# Patient Record
Sex: MALE | Race: WHITE | NOT HISPANIC OR LATINO | Employment: UNEMPLOYED | URBAN - METROPOLITAN AREA
[De-identification: names, ages, dates, MRNs, and addresses within clinical notes are randomized per-mention and may not be internally consistent; named-entity substitution may affect disease eponyms.]

---

## 2018-01-23 ENCOUNTER — HOSPITAL ENCOUNTER (EMERGENCY)
Facility: MEDICAL CENTER | Age: 43
End: 2018-01-23
Attending: EMERGENCY MEDICINE

## 2018-01-23 VITALS
RESPIRATION RATE: 16 BRPM | HEIGHT: 76 IN | SYSTOLIC BLOOD PRESSURE: 150 MMHG | WEIGHT: 259.26 LBS | DIASTOLIC BLOOD PRESSURE: 65 MMHG | TEMPERATURE: 98.2 F | BODY MASS INDEX: 31.57 KG/M2 | OXYGEN SATURATION: 97 % | HEART RATE: 60 BPM

## 2018-01-23 DIAGNOSIS — M54.41 ACUTE RIGHT-SIDED LOW BACK PAIN WITH RIGHT-SIDED SCIATICA: ICD-10-CM

## 2018-01-23 PROCEDURE — 99283 EMERGENCY DEPT VISIT LOW MDM: CPT

## 2018-01-23 RX ORDER — HYDROCODONE BITARTRATE AND ACETAMINOPHEN 7.5; 325 MG/1; MG/1
1 TABLET ORAL EVERY 4 HOURS PRN
Qty: 15 TAB | Refills: 0 | Status: SHIPPED | OUTPATIENT
Start: 2018-01-23 | End: 2018-02-02

## 2018-01-23 RX ORDER — METHYLPREDNISOLONE 4 MG/1
TABLET ORAL
Qty: 1 KIT | Refills: 0 | Status: SHIPPED | OUTPATIENT
Start: 2018-01-23

## 2018-01-23 RX ORDER — IBUPROFEN 800 MG/1
800 TABLET ORAL EVERY 8 HOURS PRN
Qty: 30 TAB | Refills: 0 | Status: SHIPPED | OUTPATIENT
Start: 2018-01-23

## 2018-01-23 ASSESSMENT — PAIN SCALES - GENERAL: PAINLEVEL_OUTOF10: 7

## 2018-01-23 NOTE — ED PROVIDER NOTES
"ED Provider Note    CHIEF COMPLAINT  Chief Complaint   Patient presents with   • Low Back Pain     stepped wrong yesterday, felt \"tweak\" in back       HPI  Levar Ledesma is a 42 y.o. male who presents for evaluation of back pain.  The patient states that he stepped wrong yesterday when he walked into a small hole.  He felt like he tweaked his back.  He complains of pain diffusely over the lower back area with both left and right lower back pain.  He did have some pain radiating into his right leg but that has subsided.  The patient denies: Fever, chills, URI symptoms, cardiac or respiratory symptoms, gastrointestinal symptoms, loss of bowel or bladder function, motor weakness or paresthesias.  No other acute symptomatology or complaints.    REVIEW OF SYSTEMS  See HPI for further details.  He denies major health problems such as: Hypertension, diabetes, thyroid dysfunction, seizures, cardiopulmonary disorder, gastrointestinal disorders.  He denies any previous major back problems or or specific treatment or surgical intervention.    PAST MEDICAL HISTORY  None    FAMILY HISTORY  History reviewed. No pertinent family history.    SOCIAL HISTORY  Previous smoker; denies alcohol use; positive marijuana use;    SURGICAL HISTORY  History reviewed. No pertinent surgical history.    CURRENT MEDICATIONS  See nurse's notes    ALLERGIES  Allergies   Allergen Reactions   • Morphine Hives       PHYSICAL EXAM  VITAL SIGNS: BP (!) 167/71   Pulse 70   Temp 36.8 °C (98.2 °F)   Resp 16   Ht 1.93 m (6' 4\")   Wt 117.6 kg (259 lb 4.2 oz)   SpO2 99%   BMI 31.56 kg/m²    Constitutional: Year-old male, Well developed, Well nourished, No acute distress, Non-toxic appearance, appears uncomfortable with movement but oriented ×3.   HENT: Normocephalic, Atraumatic, Nares:Clear, Oropharynx: moist, well hydrated, posterior pharynx:clear   Eyes: PERRL, EOMI, Conjunctiva normal, No discharge.   Neck: Normal range of motion, No " tenderness, Supple, No stridor.   Lymphatic: No lymphadenopathy noted.   Cardiovascular: Regular rate and rhythm without mumurs, gallups, rubs   Thorax & Lungs: Normal Equal breath sounds, No respiratory distress, No wheezing, no stridor, no rales. No chest tenderness.   Abdomen: Soft, nontender, nondistended, no organomegaly, positive bowel sounds normal in quality. No guarding or rebound.  Skin: Good skin turgor, pink, warm, dry. No rashes, petechiae, purpura. Normal capillary refill.   Back: Tenderness diffusely about the right and left paraspinous musculature in the mid to lower lumbar spine region, No CVA tenderness.   Extremities: Intact distal pulses, No edema, No tenderness, No cyanosis,  Vascular: Pulses are 2+, symmetric in the upper and lower extremities.  Musculoskeletal: Good range of motion in all major joints. No tenderness to palpation or major deformities noted.   Neurologic: Alert & oriented x 3,  No gross focal deficits noted.  DTRs 2+ and symmetric; mildly positive straight leg on the right at 45°;  Psychiatric: Affect normal, Judgment normal, Mood normal.     COURSE & MEDICAL DECISION MAKING  Pertinent Labs & Imaging studies reviewed. (See chart for details)  Discussion: At this time, the patient's findings appear mostly be myofascial in origin.  However, the patient does have some radicular symptomatology.  At this time, I see no evidence of any infectious cause.  No evidence of any intra-abdominal disorders causing referred back pain.  No evidence of acute cord syndrome or epidural abscess.  Based on the findings, patient is stable for discharge.  I have discussed the findings and treatment plan with patient.  He indicates that he is comfortable with this explanation and disposition.    FINAL IMPRESSION  1. Acute right-sided low back pain with right-sided sciatica         PLAN  1.  Appropriate discharge instructions give  2.  Lortab 7.5 mg #15; the  database was reviewed and informed  consent was obtained;  3.  Medrol Dosepak  4.  Motrin 800 mg #30  5.  Follow up primary care  6.  Follow-up with spine surgery    Electronically signed by: Guy G Gansert, 1/23/2018 8:33 AM

## 2018-01-23 NOTE — ED NOTES
"Chief Complaint   Patient presents with   • Low Back Pain     stepped wrong yesterday, felt \"tweak\" in back       "

## 2018-01-23 NOTE — ED NOTES
Reviewed discharge instructions and Medrol, Norco and Motrin prescriptions w/ pt, verbalized understanding to information provided including follow up care, return precautions and no drinking/driving while taking Norco.  Denied questions/concerns.  Reviewed Narcotic policy w/ pt, consent signed and placed on chart.  Pt ambulated from ER.

## 2018-03-17 NOTE — DISCHARGE INSTRUCTIONS
Cryotherapy  Cryotherapy means treatment with cold. Ice or gel packs can be used to reduce both pain and swelling. Ice is the most helpful within the first 24 to 48 hours after an injury or flare-up from overusing a muscle or joint. Sprains, strains, spasms, burning pain, shooting pain, and aches can all be eased with ice. Ice can also be used when recovering from surgery. Ice is effective, has very few side effects, and is safe for most people to use.  PRECAUTIONS   Ice is not a safe treatment option for people with:  · Raynaud phenomenon. This is a condition affecting small blood vessels in the extremities. Exposure to cold may cause your problems to return.  · Cold hypersensitivity. There are many forms of cold hypersensitivity, including:  ¨ Cold urticaria. Red, itchy hives appear on the skin when the tissues begin to warm after being iced.  ¨ Cold erythema. This is a red, itchy rash caused by exposure to cold.  ¨ Cold hemoglobinuria. Red blood cells break down when the tissues begin to warm after being iced. The hemoglobin that carry oxygen are passed into the urine because they cannot combine with blood proteins fast enough.  · Numbness or altered sensitivity in the area being iced.  If you have any of the following conditions, do not use ice until you have discussed cryotherapy with your caregiver:  · Heart conditions, such as arrhythmia, angina, or chronic heart disease.  · High blood pressure.  · Healing wounds or open skin in the area being iced.  · Current infections.  · Rheumatoid arthritis.  · Poor circulation.  · Diabetes.  Ice slows the blood flow in the region it is applied. This is beneficial when trying to stop inflamed tissues from spreading irritating chemicals to surrounding tissues. However, if you expose your skin to cold temperatures for too long or without the proper protection, you can damage your skin or nerves. Watch for signs of skin damage due to cold.  HOME CARE INSTRUCTIONS  Follow  these tips to use ice and cold packs safely.  · Place a dry or damp towel between the ice and skin. A damp towel will cool the skin more quickly, so you may need to shorten the time that the ice is used.  · For a more rapid response, add gentle compression to the ice.  · Ice for no more than 10 to 20 minutes at a time. The bonier the area you are icing, the less time it will take to get the benefits of ice.  · Check your skin after 5 minutes to make sure there are no signs of a poor response to cold or skin damage.  · Rest 20 minutes or more between uses.  · Once your skin is numb, you can end your treatment. You can test numbness by very lightly touching your skin. The touch should be so light that you do not see the skin dimple from the pressure of your fingertip. When using ice, most people will feel these normal sensations in this order: cold, burning, aching, and numbness.  · Do not use ice on someone who cannot communicate their responses to pain, such as small children or people with dementia.  HOW TO MAKE AN ICE PACK  Ice packs are the most common way to use ice therapy. Other methods include ice massage, ice baths, and cryosprays. Muscle creams that cause a cold, tingly feeling do not offer the same benefits that ice offers and should not be used as a substitute unless recommended by your caregiver.  To make an ice pack, do one of the following:  · Place crushed ice or a bag of frozen vegetables in a sealable plastic bag. Squeeze out the excess air. Place this bag inside another plastic bag. Slide the bag into a pillowcase or place a damp towel between your skin and the bag.  · Mix 3 parts water with 1 part rubbing alcohol. Freeze the mixture in a sealable plastic bag. When you remove the mixture from the freezer, it will be slushy. Squeeze out the excess air. Place this bag inside another plastic bag. Slide the bag into a pillowcase or place a damp towel between your skin and the bag.  SEEK MEDICAL CARE  IF:  · You develop white spots on your skin. This may give the skin a blotchy (mottled) appearance.  · Your skin turns blue or pale.  · Your skin becomes waxy or hard.  · Your swelling gets worse.  MAKE SURE YOU:   · Understand these instructions.  · Will watch your condition.  · Will get help right away if you are not doing well or get worse.     This information is not intended to replace advice given to you by your health care provider. Make sure you discuss any questions you have with your health care provider.     Document Released: 08/13/2012 Document Revised: 01/08/2016 Document Reviewed: 08/13/2012  [employer] Interactive Patient Education ©2016 [employer]  Sciatica  Sciatica is pain, weakness, numbness, or tingling along the path of the sciatic nerve. The nerve starts in the lower back and runs down the back of each leg. The nerve controls the muscles in the lower leg and in the back of the knee, while also providing sensation to the back of the thigh, lower leg, and the sole of your foot. Sciatica is a symptom of another medical condition. For instance, nerve damage or certain conditions, such as a herniated disk or bone spur on the spine, pinch or put pressure on the sciatic nerve. This causes the pain, weakness, or other sensations normally associated with sciatica. Generally, sciatica only affects one side of the body.  CAUSES   · Herniated or slipped disc.  · Degenerative disk disease.  · A pain disorder involving the narrow muscle in the buttocks (piriformis syndrome).  · Pelvic injury or fracture.  · Pregnancy.  · Tumor (rare).  SYMPTOMS   Symptoms can vary from mild to very severe. The symptoms usually travel from the low back to the buttocks and down the back of the leg. Symptoms can include:  · Mild tingling or dull aches in the lower back, leg, or hip.  · Numbness in the back of the calf or sole of the foot.  · Burning sensations in the lower back, leg, or hip.  · Sharp pains in the lower back,  leg, or hip.  · Leg weakness.  · Severe back pain inhibiting movement.  These symptoms may get worse with coughing, sneezing, laughing, or prolonged sitting or standing. Also, being overweight may worsen symptoms.  DIAGNOSIS   Your caregiver will perform a physical exam to look for common symptoms of sciatica. He or she may ask you to do certain movements or activities that would trigger sciatic nerve pain. Other tests may be performed to find the cause of the sciatica. These may include:  · Blood tests.  · X-rays.  · Imaging tests, such as an MRI or CT scan.  TREATMENT   Treatment is directed at the cause of the sciatic pain. Sometimes, treatment is not necessary and the pain and discomfort goes away on its own. If treatment is needed, your caregiver may suggest:  · Over-the-counter medicines to relieve pain.  · Prescription medicines, such as anti-inflammatory medicine, muscle relaxants, or narcotics.  · Applying heat or ice to the painful area.  · Steroid injections to lessen pain, irritation, and inflammation around the nerve.  · Reducing activity during periods of pain.  · Exercising and stretching to strengthen your abdomen and improve flexibility of your spine. Your caregiver may suggest losing weight if the extra weight makes the back pain worse.  · Physical therapy.  · Surgery to eliminate what is pressing or pinching the nerve, such as a bone spur or part of a herniated disk.  HOME CARE INSTRUCTIONS   · Only take over-the-counter or prescription medicines for pain or discomfort as directed by your caregiver.  · Apply ice to the affected area for 20 minutes, 3-4 times a day for the first 48-72 hours. Then try heat in the same way.  · Exercise, stretch, or perform your usual activities if these do not aggravate your pain.  · Attend physical therapy sessions as directed by your caregiver.  · Keep all follow-up appointments as directed by your caregiver.  · Do not wear high heels or shoes that do not provide  proper support.  · Check your mattress to see if it is too soft. A firm mattress may lessen your pain and discomfort.  SEEK IMMEDIATE MEDICAL CARE IF:   · You lose control of your bowel or bladder (incontinence).  · You have increasing weakness in the lower back, pelvis, buttocks, or legs.  · You have redness or swelling of your back.  · You have a burning sensation when you urinate.  · You have pain that gets worse when you lie down or awakens you at night.  · Your pain is worse than you have experienced in the past.  · Your pain is lasting longer than 4 weeks.  · You are suddenly losing weight without reason.  MAKE SURE YOU:  · Understand these instructions.  · Will watch your condition.  · Will get help right away if you are not doing well or get worse.     This information is not intended to replace advice given to you by your health care provider. Make sure you discuss any questions you have with your health care provider.     Document Released: 12/12/2002 Document Revised: 06/18/2013 Document Reviewed: 04/28/2013  [employer] Interactive Patient Education ©2016 [employer]  Lumbosacral Strain  Lumbosacral strain is a strain of any of the parts that make up your lumbosacral vertebrae. Your lumbosacral vertebrae are the bones that make up the lower third of your backbone. Your lumbosacral vertebrae are held together by muscles and tough, fibrous tissue (ligaments).   CAUSES   A sudden blow to your back can cause lumbosacral strain. Also, anything that causes an excessive stretch of the muscles in the low back can cause this strain. This is typically seen when people exert themselves strenuously, fall, lift heavy objects, bend, or crouch repeatedly.  RISK FACTORS  · Physically demanding work.  · Participation in pushing or pulling sports or sports that require a sudden twist of the back (tennis, golf, baseball).  · Weight lifting.  · Excessive lower back curvature.  · Forward-tilted pelvis.  · Weak back or  abdominal muscles or both.  · Tight hamstrings.  SIGNS AND SYMPTOMS   Lumbosacral strain may cause pain in the area of your injury or pain that moves (radiates) down your leg.   DIAGNOSIS  Your health care provider can often diagnose lumbosacral strain through a physical exam. In some cases, you may need tests such as X-ray exams.   TREATMENT   Treatment for your lower back injury depends on many factors that your clinician will have to evaluate. However, most treatment will include the use of anti-inflammatory medicines.  HOME CARE INSTRUCTIONS   · Avoid hard physical activities (tennis, racquetball, waterskiing) if you are not in proper physical condition for it. This may aggravate or create problems.  · If you have a back problem, avoid sports requiring sudden body movements. Swimming and walking are generally safer activities.  · Maintain good posture.  · Maintain a healthy weight.  · For acute conditions, you may put ice on the injured area.  ¨ Put ice in a plastic bag.  ¨ Place a towel between your skin and the bag.  ¨ Leave the ice on for 20 minutes, 2-3 times a day.  · When the low back starts healing, stretching and strengthening exercises may be recommended.  SEEK MEDICAL CARE IF:  · Your back pain is getting worse.  · You experience severe back pain not relieved with medicines.  SEEK IMMEDIATE MEDICAL CARE IF:   · You have numbness, tingling, weakness, or problems with the use of your arms or legs.  · There is a change in bowel or bladder control.  · You have increasing pain in any area of the body, including your belly (abdomen).  · You notice shortness of breath, dizziness, or feel faint.  · You feel sick to your stomach (nauseous), are throwing up (vomiting), or become sweaty.  · You notice discoloration of your toes or legs, or your feet get very cold.  MAKE SURE YOU:   · Understand these instructions.  · Will watch your condition.  · Will get help right away if you are not doing well or get worse.      This information is not intended to replace advice given to you by your health care provider. Make sure you discuss any questions you have with your health care provider.     Document Released: 09/27/2006 Document Revised: 01/08/2016 Document Reviewed: 08/06/2014  [employer] Interactive Patient Education ©2016 [employer]     squirming/contracted limbs/guarding/moaning/restless/grimace/crying